# Patient Record
Sex: FEMALE | Race: BLACK OR AFRICAN AMERICAN | NOT HISPANIC OR LATINO | Employment: FULL TIME | URBAN - METROPOLITAN AREA
[De-identification: names, ages, dates, MRNs, and addresses within clinical notes are randomized per-mention and may not be internally consistent; named-entity substitution may affect disease eponyms.]

---

## 2017-03-30 ENCOUNTER — COMMUNICATION - HEALTHEAST (OUTPATIENT)
Dept: FAMILY MEDICINE | Facility: CLINIC | Age: 42
End: 2017-03-30

## 2017-04-11 ENCOUNTER — OFFICE VISIT - HEALTHEAST (OUTPATIENT)
Dept: FAMILY MEDICINE | Facility: CLINIC | Age: 42
End: 2017-04-11

## 2017-04-11 DIAGNOSIS — G43.009 ATYPICAL MIGRAINE: ICD-10-CM

## 2017-04-11 ASSESSMENT — MIFFLIN-ST. JEOR: SCORE: 1395.24

## 2017-06-12 ENCOUNTER — COMMUNICATION - HEALTHEAST (OUTPATIENT)
Dept: FAMILY MEDICINE | Facility: CLINIC | Age: 42
End: 2017-06-12

## 2017-06-13 ENCOUNTER — OFFICE VISIT - HEALTHEAST (OUTPATIENT)
Dept: FAMILY MEDICINE | Facility: CLINIC | Age: 42
End: 2017-06-13

## 2017-06-13 DIAGNOSIS — N76.0 VAGINITIS: ICD-10-CM

## 2017-06-13 ASSESSMENT — MIFFLIN-ST. JEOR: SCORE: 1394.1

## 2017-06-14 ENCOUNTER — COMMUNICATION - HEALTHEAST (OUTPATIENT)
Dept: FAMILY MEDICINE | Facility: CLINIC | Age: 42
End: 2017-06-14

## 2017-06-27 ENCOUNTER — RECORDS - HEALTHEAST (OUTPATIENT)
Dept: ADMINISTRATIVE | Facility: OTHER | Age: 42
End: 2017-06-27

## 2017-06-27 ENCOUNTER — COMMUNICATION - HEALTHEAST (OUTPATIENT)
Dept: FAMILY MEDICINE | Facility: CLINIC | Age: 42
End: 2017-06-27

## 2018-01-11 ENCOUNTER — COMMUNICATION - HEALTHEAST (OUTPATIENT)
Dept: FAMILY MEDICINE | Facility: CLINIC | Age: 43
End: 2018-01-11

## 2018-10-10 ENCOUNTER — HEALTH MAINTENANCE LETTER (OUTPATIENT)
Age: 43
End: 2018-10-10

## 2018-10-10 ENCOUNTER — OFFICE VISIT (OUTPATIENT)
Dept: FAMILY MEDICINE | Facility: CLINIC | Age: 43
End: 2018-10-10
Payer: COMMERCIAL

## 2018-10-10 VITALS
BODY MASS INDEX: 23.79 KG/M2 | OXYGEN SATURATION: 100 % | SYSTOLIC BLOOD PRESSURE: 125 MMHG | HEART RATE: 86 BPM | HEIGHT: 67 IN | WEIGHT: 151.6 LBS | TEMPERATURE: 98.5 F | DIASTOLIC BLOOD PRESSURE: 86 MMHG

## 2018-10-10 DIAGNOSIS — J06.9 VIRAL URI WITH COUGH: Primary | ICD-10-CM

## 2018-10-10 DIAGNOSIS — Z12.4 CERVICAL CANCER SCREENING: ICD-10-CM

## 2018-10-10 DIAGNOSIS — Z86.69 HISTORY OF ATYPICAL MIGRAINE: ICD-10-CM

## 2018-10-10 DIAGNOSIS — Z72.0 TOBACCO ABUSE DISORDER: ICD-10-CM

## 2018-10-10 RX ORDER — CYCLOBENZAPRINE HCL 10 MG
10 TABLET ORAL 3 TIMES DAILY PRN
Qty: 30 TABLET | Refills: 1 | Status: SHIPPED | OUTPATIENT
Start: 2018-10-10 | End: 2023-05-05

## 2018-10-10 NOTE — PATIENT INSTRUCTIONS
HOW TO DISPOSE OF MEDICINES    Holding onto unused or  medicines, both prescription and over the counter, poses many risks including the potential for drug abuse or poisoning. Disposing of your medicines properly can help keep you, your family, your friends, and our environment safe. Two options are available:     Dispose of unused medicines at home.  1. Take your medicines out of their container & mix with nghia litter, coffee grounds, or another undesirable substance.  2. Put the mixture in a disposable container with a lid or a sealed Ziploc bag.  3. Remove any personal information (like name, address, and prescription number) on the empty bottle label; or completely remove the label.  4. Throw away the now empty and unmarked bottle(s) and the sealed container of drug mixture into the regular trash.  5. NEVER THROW MEDICATIONS IN THE TOILET OR DOWN THE DRAIN.    Take the prescription bottles to a Medicine Collection Site.  These sites accept unused prescription and over-the-counter medicines. This is a free and confidential service, no ID is required.    Rules for disposal:  ? Bring medicines in their original containers.  ? If medication is no longer in original container, place medicine in clear plastic bag.  ? No needles, sharps, or syringes are accepted at these sites.  ? Remove your name and any other personal information (like address and prescription number) from the prescription label, but keep the drug name on the bottle.     Medicine drop off locations by [facility]: [facility]:  ? [place]  ? [facility] [place] [facility]: [address]  ? Monday - Friday 8am to 4pm  ? [place]  ? [place]: [address] [place] [address]  ? Monday - Friday 8:30am to 4:30pm  ? [place]  ? [facility] [place]: [address] [place] [address]  ? Monday - Friday 8am to 4pm  ? [place]: [address]  ? 7am - 11pm, 7 days a week

## 2018-10-10 NOTE — PROGRESS NOTES
"     HPI:   Nel Landers is a 43 year old female who presents as a new patient to clinic today to establish care.  Establish Care (Establish care) and Medication Reconciliation (completed, Pt is taking Flexrill )    Establish care  -past medical/surgical, family, and social history reviewed and updated  Nel recently changed insurance for work and was required to attend a clinic that was closer to her home in order for her to have proper coverage. Patient needed a refill of her Flexril 5 mg which was last filled in 2016. She uses it for atypical migraines that she gets about 1-2 times a month. It begins with tension and tightness in her shoulders and neck. Then it radiates up to the top of her head. If she does not treat it quickly, she reports it then advances to \"stroke-like\" symptoms where she is unable to move her whole body. It often resolves on its own after a while. To prevent her atypical migraines, she has an established treatment of taking her flexril, ibuprofen, and rests which has usually resolve the migraine. She has tried triptons and fioricet in the past but has associated nausea and sick-like feelings.     Nel presents with a 2 week cough and several episodes of fevers. She says that she went to an urgent care facility where she was prescribed Delsym, cough suppressant drops and Mucinin D. She says that her coughing has improved and reports no fevers in the past 48 hours.     Social: Nel has an 18.75 year tobacco use history.     The patient speaks English, so no  was used for this visit.        Review of Systems:   C: NEGATIVE for fatigue, unexpected change in weight  E: NEGATIVE for acute vision problems or changes  R: Positive for significant cough or shortness of breath  CV: NEGATIVE for chest pain, palpitations or new or worsening peripheral edema  GI: NEGATIVE for new onset or worsening nausea, vomiting or diarrhea  : NEGATIVE for frequency, dysuria, or hematuria  M: " "NEGATIVE for claudication, myalgias  N: NEGATIVE for weakness, dizziness, syncope, headaches  P: NEGATIVE for changes in mood or affect  GYN: LMP: No LMP recorded. and regular   Sexually Active: No   Sexual concerns: No    Contraception: Essure    Pregnancy History: [id number]   Menses: No LMP recorded.  are regular   STD History: Neg    Last Pap Smear Date: 2016 : PAP normal   Abnormal Pap History: None          PMHX:   There is no problem list on file for this patient.    No past medical history on file.  Past Surgical History:   Procedure Laterality Date     NO HISTORY OF SURGERY       Family History   Problem Relation Age of Onset     Hypertension Mother      Pacemaker Father      Hypertension Sister      Lung Cancer Maternal Grandmother      Diabetes Paternal Grandmother      Hyperlipidemia No family hx of      Social History   Substance Use Topics     Smoking status: Current Every Day Smoker     Smokeless tobacco: Never Used     Alcohol use Not on file     Social History     Social History Narrative     No narrative on file     No Known Allergies    There is no immunization history on file for this patient.  No results found for this or any previous visit (from the past 24 hour(s)).       Physical Exam:   /86  Pulse 86  Temp 98.5  F (36.9  C) (Oral)  Ht 5' 6.53\" (169 cm)  Wt 151 lb 9.6 oz (68.8 kg)  SpO2 100%  BMI 24.08 kg/m2  Gen: alert, NAD,   HENT: oral mucosa moist. No pharyngeal exudate or significant erythema  Card: RRR, no murmurs  Resp: end-expiratory wheezing with forced expiration. No crackles. Harsh cough. No asymmetry. No increased work of breathing  MS: extremities grossly normal. No LE edema  Skin: no rashes on exposed skin  Neuro: mentation intact, speech normal  Psych: mood normal, affect normal  Assessment and Plan     1. Viral URI with cough  Symptoms resolving with current treatment. Reassurance provided.     2. History of atypical migraine  Was hospitalized in 2014 for atypical " migraines. Has tried triptans and fioricet without success. Has an established treatment using flexeril and requested a refill. Last prescribed 5-10 mg 30 tabs with 3 refills in 6/2017, requesting 10 mg tablets so she doesn't always have to take 2.    - cyclobenzaprine (FLEXERIL) 10 MG tablet; Take 1 tablet (10 mg) by mouth 3 times daily as needed for other (migraine)  Dispense: 30 tablet; Refill: 1    3. Tobacco abuse disorder  Patient was in the planning stage. She has tried the patch with little success. She wants to start on Chantix. She plans to set a quit date in the next couple of weeks with her male partner.   -F/u in 4 weeks for tobacco cessation mayo    4. Cervical cancer screening  Due for pap in Spring 2019    Follow up: 4 wks for tobacco cessation counseling. Otherwise CPE in 3-5 months with pap    Jose Nicholson, MS3    I was present with the medical student who participated in the service and in the documentation of this note. I have verified the history and personally performed the physical exam and medical decision making, and have verified the content of the note, which accurately reflects my assessment of the patient and the plan of care.   MD Margret Escamilla MD, MPH  [facility] Family Medicine Resident    Precepted patient with Lorin Grove MD    Options for treatment and follow-up care were reviewed with the patient and/or guardian. Nel Landers and/or guardian engaged in the decision making process and verbalized understanding of the options discussed and agreed with the final plan.

## 2018-10-10 NOTE — MR AVS SNAPSHOT
After Visit Summary   10/10/2018    Nel Landers    MRN: [id number]           Patient Information     Date Of Birth          1975        Visit Information        Provider Department      10/10/2018 10:20 AM Margret Plasencia MD [facility]        Today's Diagnoses     Viral URI with cough    -  1    History of atypical migraine        Tobacco abuse disorder        Cervical cancer screening          Care Instructions    HOW TO DISPOSE OF MEDICINES    Holding onto unused or  medicines, both prescription and over the counter, poses many risks including the potential for drug abuse or poisoning. Disposing of your medicines properly can help keep you, your family, your friends, and our environment safe. Two options are available:     Dispose of unused medicines at home.  1. Take your medicines out of their container & mix with nghia litter, coffee grounds, or another undesirable substance.  2. Put the mixture in a disposable container with a lid or a sealed Ziploc bag.  3. Remove any personal information (like name, address, and prescription number) on the empty bottle label; or completely remove the label.  4. Throw away the now empty and unmarked bottle(s) and the sealed container of drug mixture into the regular trash.  5. NEVER THROW MEDICATIONS IN THE TOILET OR DOWN THE DRAIN.    Take the prescription bottles to a Medicine Collection Site.  These sites accept unused prescription and over-the-counter medicines. This is a free and confidential service, no ID is required.    Rules for disposal:  ? Bring medicines in their original containers.  ? If medication is no longer in original container, place medicine in clear plastic bag.  ? No needles, sharps, or syringes are accepted at these sites.  ? Remove your name and any other personal information (like address and prescription number) from the prescription label, but keep the drug name on the bottle.     Medicine drop off  "locations by county: [facility]:  ? [place]  ? [place]: [address]  ? Monday - Friday 8am to 4pm  ? [place]  ? [place]: [address] [place] [address]  ? Monday - Friday 8:30am to 4:30pm  ? [place]  ? [place]: [address] [place] [address]  ? Monday - Friday 8am to 4pm  ? [place]: [address]  ? 7am - 11pm, 7 days a week              Follow-ups after your visit        Follow-up notes from your care team     Return in about 4 weeks (around 2018) for tobacco.      Who to contact     Please call your clinic at [phone] to:    Ask questions about your health    Make or cancel appointments    Discuss your medicines    Learn about your test results    Speak to your doctor            Additional Information About Your Visit        MyChart Information     [email or web address] is an electronic gateway that provides easy, online access to your medical records. With [email or web address], you can request a clinic appointment, read your test results, renew a prescription or communicate with your care team.     To sign up for [email or web address] visit the website at [email or web address]   You will be asked to enter the access code listed below, as well as some personal information. Please follow the directions to create your username and password.     Your access code is: [email or web address]  Expires: 2019 10:05 AM     Your access code will  in 90 days. If you need help or a new code, please contact your [facility] or call [phone] for assistance.        Care EveryWhere ID     This is your Care EveryWhere ID. This could be used by other organizations to access your [facility] medical records  [id number]        Your Vitals Were     Pulse Temperature Height Pulse Oximetry BMI (Body Mass Index)       86 98.5  F (36.9  C) (Oral) 5' 6.53\" (169 cm) 100% 24.08 kg/m2        Blood Pressure from Last 3 Encounters:   10/10/18 125/86    Weight from Last 3 Encounters: "   10/10/18 151 lb 9.6 oz (68.8 kg)              Today, you had the following     No orders found for display         Today's Medication Changes          These changes are accurate as of 10/10/18 11:36 AM.  If you have any questions, ask your nurse or doctor.               Start taking these medicines.        Dose/Directions    cyclobenzaprine 10 MG tablet   Commonly known as:  FLEXERIL   Used for:  History of atypical migraine   Started by:  Margret Plasencia MD        Dose:  10 mg   Take 1 tablet (10 mg) by mouth 3 times daily as needed for other (migraine)   Quantity:  30 tablet   Refills:  1            Where to get your medicines      These medications were sent to [id number] - [place], [place] - [address]  [address] [facility], [place] [address]     Phone:  [phone]     cyclobenzaprine 10 MG tablet                Primary Care Provider Office Phone # Fax #    Margret Plasencia [phone]       [facility] [address] [place] [address]        Equal Access to Services     [facility]: Hadii aad ku hadasho Soomaali, waaxda luqadaha, qaybta kaalmada adeegyada, waxay idiin hayrosan tesfaye shaffer . So [facility] [phone].    ATENCIÓN: Si habla español, tiene a montgomery disposición servicios gratuitos de asistencia lingüística. Llame al [phone].    We comply with applicable federal civil rights laws and [place] laws. We do not discriminate on the basis of race, color, national origin, age, disability, sex, sexual orientation, or gender identity.            Thank you!     Thank you for choosing [facility]  for your care. Our goal is always to provide you with excellent care. Hearing back from our patients is one way we can continue to improve our services. Please take a few minutes to complete the written survey that you may receive in the mail after your visit with us. Thank you!             Your Updated Medication List - Protect others around  you: Learn how to safely use, store and throw away your medicines at www.disposemymeds.org.          This list is accurate as of 10/10/18 11:36 AM.  Always use your most recent med list.                   Brand Name Dispense Instructions for use Diagnosis    cyclobenzaprine 10 MG tablet    FLEXERIL    30 tablet    Take 1 tablet (10 mg) by mouth 3 times daily as needed for other (migraine)    History of atypical migraine

## 2018-10-19 NOTE — PROGRESS NOTES
Preceptor Attestation:   Patient seen, evaluated and discussed with the resident, Dr. Margret Plasencia. I have verified the content of the note, which accurately reflects my assessment of the patient and the plan of care.  Supervising Physician:Lorin Grove MD  [facility]

## 2021-05-25 ENCOUNTER — RECORDS - HEALTHEAST (OUTPATIENT)
Dept: ADMINISTRATIVE | Facility: CLINIC | Age: 46
End: 2021-05-25

## 2021-05-26 ENCOUNTER — RECORDS - HEALTHEAST (OUTPATIENT)
Dept: ADMINISTRATIVE | Facility: CLINIC | Age: 46
End: 2021-05-26

## 2021-05-29 ENCOUNTER — RECORDS - HEALTHEAST (OUTPATIENT)
Dept: ADMINISTRATIVE | Facility: CLINIC | Age: 46
End: 2021-05-29

## 2021-05-30 ENCOUNTER — RECORDS - HEALTHEAST (OUTPATIENT)
Dept: ADMINISTRATIVE | Facility: CLINIC | Age: 46
End: 2021-05-30

## 2021-05-30 VITALS — HEIGHT: 67 IN | WEIGHT: 156 LBS | BODY MASS INDEX: 24.48 KG/M2

## 2021-05-31 VITALS — HEIGHT: 68 IN | BODY MASS INDEX: 23.34 KG/M2 | WEIGHT: 154 LBS

## 2021-06-11 NOTE — PROGRESS NOTES
Assessment/Plan:     1. Vaginitis  Wet prep is positive for bacterial vaginosis will treat as below supportive care discussed.  Gonorrhea chlamydia pending.  Urinalysis negative.  - Wet Prep, Vaginal  - Urinalysis-UC if Indicated  - Chlamydia trachomatis & Neisseria gonorrhoeae, Amplified Detection  - metroNIDAZOLE (FLAGYL) 500 MG tablet; Take 1 tablet (500 mg total) by mouth 2 (two) times a day for 7 days.  Dispense: 14 tablet; Refill: 1    2. Chronic migraine  Refilled Flexeril.  Will try Relpax as patient has not found Fioricet or Imitrex to be helpful.  Did tolerate Percocet for migraines.  Again if symptoms worsen or do not improve become more frequent would recommend neurological evaluation.  - cyclobenzaprine (FLEXERIL) 5 MG tablet; Take 1-2 tablets (5-10 mg total) by mouth 3 (three) times a day as needed for muscle spasms.  Dispense: 30 tablet; Refill: 2  - eletriptan (RELPAX) 20 MG tablet; Take 1-2 tablets (20-40 mg total) by mouth as needed for migraine. may repeat in 2 hours if necessary  Dispense: 10 tablet; Refill: 2      Subjective:      Nel Campo is a 41 y.o. female comes in today primarily for vaginal discharge.  Patient originally states that she wants to follow-up on positive trichomoniasis result.  She states that she got this result from this office however I do not see past records of that.  I see that she has had a wet prep that was positive for yeast infection in the past.  She states she has been with her boyfriend for 4 years.  She states that he has not recently been treated for anything.  She states they do not use condoms but she believes her relationship to be monogamous and that they both had STD screenings prior to becoming sexually active.  She describes the symptoms as a foul fishy odor as well as significant amount of watery discharge that she will notice come out of her when she stands up states it feels like she wets her pants sometimes.  She has not had any significant  itching burning no changes with urination but she would like to get her urine checked today.  Also has history of chronic migraines.  At last visit we gave her Fioricet she did not find it to be helpful.  She has not found Imitrex to be helpful in the past.  She states Percocet works.  Also Flexeril works that she would like refill.  The headaches are not new and different in intensity.  She is getting about 1 a week.  No other new concerns today.    Current Outpatient Prescriptions   Medication Sig Dispense Refill     glucosamine-chondroitin 500-400 mg cap Take 1 capsule by mouth 3 (three) times a day.       multivitamin therapeutic (THERAGRAN) tablet Take 1 tablet by mouth daily.       OMEGA-3/DHA/EPA/FISH OIL (FISH OIL-OMEGA-3 FATTY ACIDS) 300-1,000 mg capsule Take 2 g by mouth daily.       cyclobenzaprine (FLEXERIL) 5 MG tablet Take 1-2 tablets (5-10 mg total) by mouth 3 (three) times a day as needed for muscle spasms. 30 tablet 2     eletriptan (RELPAX) 20 MG tablet Take 1-2 tablets (20-40 mg total) by mouth as needed for migraine. may repeat in 2 hours if necessary 10 tablet 2     metroNIDAZOLE (FLAGYL) 500 MG tablet Take 1 tablet (500 mg total) by mouth 2 (two) times a day for 7 days. 14 tablet 1     SUMAtriptan (IMITREX) 50 MG tablet   3     No current facility-administered medications for this visit.        Past Medical History, Family History, and Social History reviewed.  Past Medical History:   Diagnosis Date     Migraine      No past surgical history on file.  Review of patient's allergies indicates no known allergies.  Family History   Problem Relation Age of Onset     Multiple sclerosis Mother      Diabetes Father      Social History     Social History     Marital status:      Spouse name: N/A     Number of children: N/A     Years of education: N/A     Occupational History     Not on file.     Social History Main Topics     Smoking status: Current Every Day Smoker     Packs/day: 0.50      "Years: 26.00     Types: Cigarettes     Start date: 11/7/1990     Smokeless tobacco: Never Used     Alcohol use 0.6 oz/week     1 Standard drinks or equivalent per week      Comment: one drink a month     Drug use: No     Sexual activity: Not on file     Other Topics Concern     Not on file     Social History Narrative         Review of systems is as stated in HPI, and the remainder of the 10 system review is otherwise negative.    Objective:     Vitals:    06/13/17 1259   BP: 110/84   Pulse: 99   SpO2: 98%   Weight: 154 lb (69.9 kg)   Height: 5' 7.5\" (1.715 m)    Body mass index is 23.76 kg/(m^2).    General Appearance:    Alert, cooperative, no distress, appears stated age   Head:    Normocephalic, without obvious abnormality, atraumatic   Eyes:    PERRL   Ears:    Normal TM's and external ear canals   Nose:   Mucosa normal, no drainage      Throat:   Oropharynx is clear   Neck:   Supple, symmetrical, no adenopathy, no thyromegally       Lungs:     Clear to auscultation bilaterally, respirations unlabored   Chest Wall:    No tenderness or deformity    Heart:    Regular rate and rhythm, S1 and S2 normal, no murmur, rub    or gallop       Abdomen:     Soft, non-tender, bowel sounds active all four quadrants,     no masses, no organomegaly           Extremities:   Extremities normal, atraumatic, no cyanosis or edema   Pelvic:  there is no inguinal lymphadenopathy rate uterus adnexa are small mobile nontender.  Normal vaginal vault with thin watery slightly white in color discharge.  No significant cervical motion tenderness.     Skin:   No rashes or lesions         This note has been dictated using voice recognition software. Any grammatical or context distortions are unintentional and inherent to the the software.   "

## 2022-11-10 ENCOUNTER — OFFICE VISIT (OUTPATIENT)
Dept: FAMILY MEDICINE | Facility: CLINIC | Age: 47
End: 2022-11-10

## 2022-11-10 VITALS
HEART RATE: 74 BPM | OXYGEN SATURATION: 99 % | RESPIRATION RATE: 16 BRPM | DIASTOLIC BLOOD PRESSURE: 78 MMHG | BODY MASS INDEX: 24.14 KG/M2 | SYSTOLIC BLOOD PRESSURE: 110 MMHG | WEIGHT: 152 LBS | TEMPERATURE: 98.5 F

## 2022-11-10 DIAGNOSIS — Z78.9 FAILURE OF OUTPATIENT TREATMENT: ICD-10-CM

## 2022-11-10 DIAGNOSIS — N12 PYELONEPHRITIS: Primary | ICD-10-CM

## 2022-11-10 DIAGNOSIS — R82.90 ABNORMAL URINE ODOR: ICD-10-CM

## 2022-11-10 LAB
ALBUMIN UR-MCNC: NEGATIVE MG/DL
APPEARANCE UR: ABNORMAL
BACTERIA #/AREA URNS HPF: ABNORMAL /HPF
BILIRUB UR QL STRIP: NEGATIVE
COLOR UR AUTO: YELLOW
GLUCOSE UR STRIP-MCNC: NEGATIVE MG/DL
HGB UR QL STRIP: NEGATIVE
KETONES UR STRIP-MCNC: NEGATIVE MG/DL
LEUKOCYTE ESTERASE UR QL STRIP: NEGATIVE
NITRATE UR QL: POSITIVE
PH UR STRIP: 6.5 [PH] (ref 5–8)
RBC #/AREA URNS AUTO: ABNORMAL /HPF
SP GR UR STRIP: 1.02 (ref 1–1.03)
SQUAMOUS #/AREA URNS AUTO: ABNORMAL /LPF
UROBILINOGEN UR STRIP-ACNC: 1 E.U./DL
WBC #/AREA URNS AUTO: ABNORMAL /HPF

## 2022-11-10 PROCEDURE — 99203 OFFICE O/P NEW LOW 30 MIN: CPT | Performed by: NURSE PRACTITIONER

## 2022-11-10 PROCEDURE — 87186 SC STD MICRODIL/AGAR DIL: CPT | Performed by: NURSE PRACTITIONER

## 2022-11-10 PROCEDURE — 81001 URINALYSIS AUTO W/SCOPE: CPT | Performed by: NURSE PRACTITIONER

## 2022-11-10 PROCEDURE — 87086 URINE CULTURE/COLONY COUNT: CPT | Performed by: NURSE PRACTITIONER

## 2022-11-10 RX ORDER — CIPROFLOXACIN 500 MG/1
500 TABLET, FILM COATED ORAL 2 TIMES DAILY
Qty: 14 TABLET | Refills: 0 | Status: SHIPPED | OUTPATIENT
Start: 2022-11-10 | End: 2022-11-17

## 2022-11-10 RX ORDER — OMEGA-3 FATTY ACIDS/FISH OIL 300-1000MG
200 CAPSULE ORAL EVERY 4 HOURS PRN
COMMUNITY
End: 2023-10-31

## 2022-11-10 ASSESSMENT — ENCOUNTER SYMPTOMS: FEVER: 0

## 2022-11-10 NOTE — LETTER
[address] [address]  Phone: [phone]  Fax: [phone]    November 10, 2022        Nel Landers  [address]  [place] [address]          To whom it may concern:    RE: Nel Landers    Patient was seen and treated today at our clinic.  She may need off full or partial day tomorrow.  If she decides she cannot complete work tomorrow, please excuse her for medical reason.    Please contact me for questions or concerns.      Sincerely,        Vicki Whitaker, CNP

## 2022-11-11 NOTE — PROGRESS NOTES
Assessment & Plan     Abnormal urine odor    - UA macro with reflex to Microscopic and Culture - Clinc Collect  - Urine Microscopic Exam  - Urine Culture    Pyelonephritis    - ciprofloxacin (CIPRO) 500 MG tablet  Dispense: 14 tablet; Refill: 0    Failure of outpatient treatment       Patient with persistent urinary symptoms for a month without improvement on Macrobid done via E-visit.    Did have some CVA tenderness.  States her back pain is intermittent.  However it did start after the urinary symptoms, so do not suspect kidney stone is the primary issue.    Start Cipro.  Tylenol or ibuprofen as needed for discomfort.  Come back in 2 to 3 days if not much better, sooner if worsening such as fevers.          Return in about 3 days (around 11/13/2022).    Vicki Whitaker [facility] FELISHA Neumann is a 47 year old female who presents to clinic today for the following health issues:  Chief Complaint   Patient presents with     Back Pain     Back pain, pressure, urinate dark, cloudy, odor x 1 month      HPI    Bilateral back pain -areas of back pain seem to switch back and forth from right to left.    Urinary frequency, urgency, intermittent dysuria.,  Lower abdominal pressure.  No fevers.    Took macrobid from a virtual visit.  Temporarily improved, but sx returned.       Is a  and is bouncing around on the bus all day long, but normally does not have back pain.  Back pain started after urinary symptoms started.           Review of Systems   Constitutional: Negative for fever.           Objective    /78 (BP Location: Right arm, Patient Position: Sitting, Cuff Size: Adult Regular)   Pulse 74   Temp 98.5  F (36.9  C) (Oral)   Resp 16   Wt 68.9 kg (152 lb)   SpO2 99%   BMI 24.14 kg/m    Physical Exam  Constitutional:       General: She is not in acute distress.     Appearance: She is well-developed.   Eyes:      General:         Right eye: No  discharge.         Left eye: No discharge.      Conjunctiva/sclera: Conjunctivae normal.   Pulmonary:      Effort: Pulmonary effort is normal.   Abdominal:      Tenderness: There is right CVA tenderness. There is no left CVA tenderness.   Musculoskeletal:         General: Normal range of motion.   Skin:     General: Skin is warm and dry.      Capillary Refill: Capillary refill takes less than 2 seconds.   Neurological:      Mental Status: She is alert and oriented to person, place, and time.   Psychiatric:         Mood and Affect: Mood normal.         Behavior: Behavior normal.         Thought Content: Thought content normal.         Judgment: Judgment normal.            Results for orders placed or performed in visit on 11/10/22 (from the past 24 hour(s))   UA macro with reflex to Microscopic and Culture - Clinc Collect    Specimen: Urine, Clean Catch   Result Value Ref Range    Color Urine Yellow Colorless, Straw, Light Yellow, Yellow    Appearance Urine Slightly Cloudy (A) Clear    Glucose Urine Negative Negative, 1000 , >=2000 mg/dL    Bilirubin Urine Negative Negative    Ketones Urine Negative Negative, 160  mg/dL    Specific Gravity Urine 1.025 1.005 - 1.030    Blood Urine Negative Negative    pH Urine 6.5 5.0 - 8.0    Protein Albumin Urine Negative Negative, 300 , >=2000 mg/dL    Urobilinogen Urine 1.0 0.2, 1.0 E.U./dL    Nitrite Urine Positive (A) Negative    Leukocyte Esterase Urine Negative Negative   Urine Microscopic Exam   Result Value Ref Range    Bacteria Urine Many (A) None Seen /HPF    RBC Urine 2-5 (A) 0-2 /HPF /HPF    WBC Urine 5-10 (A) 0-5 /HPF /HPF    Squamous Epithelials Urine Moderate (A) None Seen /LPF

## 2022-11-11 NOTE — PATIENT INSTRUCTIONS
Your urine does show an infection.    We will call you if the urine culture shows that the bacteria causing the infection is resistant to the antibiotic we prescribed.    You should feel more or less back to normal after 2 full days of treatment.    You may take Tylenol or ibuprofen as needed for discomfort.    If you are getting worse such as fever, worsening back pain or you are not better after 3 days, please come back over the weekend.    The antibiotic we are giving you should also treat kidney infections.

## 2022-11-13 ENCOUNTER — TELEPHONE (OUTPATIENT)
Dept: URGENT CARE | Facility: URGENT CARE | Age: 47
End: 2022-11-13

## 2022-11-13 DIAGNOSIS — N12 PYELONEPHRITIS: Primary | ICD-10-CM

## 2022-11-13 LAB — BACTERIA UR CULT: ABNORMAL

## 2022-11-13 RX ORDER — CEFDINIR 300 MG/1
300 CAPSULE ORAL 2 TIMES DAILY
Qty: 14 CAPSULE | Refills: 0 | Status: SHIPPED | OUTPATIENT
Start: 2022-11-13 | End: 2022-11-20

## 2022-11-13 NOTE — TELEPHONE ENCOUNTER
Called to discuss intermediate sensitivity to Cipro.  Patient states her back pain is almost gone, but not completely.  She has transient twinges of back pain.  Patient would not like to start a new medication after discussion immediately.  Patient will see if she is improved by tomorrow and  cefdinir if not.  Cefdinir twice daily for 7 days sent to her pharmacy.

## 2023-05-05 ENCOUNTER — OFFICE VISIT (OUTPATIENT)
Dept: FAMILY MEDICINE | Facility: CLINIC | Age: 48
End: 2023-05-05
Payer: COMMERCIAL

## 2023-05-05 VITALS
DIASTOLIC BLOOD PRESSURE: 70 MMHG | BODY MASS INDEX: 25.43 KG/M2 | SYSTOLIC BLOOD PRESSURE: 120 MMHG | TEMPERATURE: 97.7 F | HEART RATE: 85 BPM | HEIGHT: 67 IN | WEIGHT: 162 LBS | RESPIRATION RATE: 19 BRPM | OXYGEN SATURATION: 99 %

## 2023-05-05 DIAGNOSIS — Z00.00 ROUTINE PHYSICAL EXAMINATION: Primary | ICD-10-CM

## 2023-05-05 DIAGNOSIS — Z13.220 SCREENING FOR HYPERLIPIDEMIA: ICD-10-CM

## 2023-05-05 DIAGNOSIS — Z23 ENCOUNTER FOR IMMUNIZATION: ICD-10-CM

## 2023-05-05 DIAGNOSIS — Z12.4 CERVICAL CANCER SCREENING: ICD-10-CM

## 2023-05-05 DIAGNOSIS — Z13.1 SCREENING FOR DIABETES MELLITUS: ICD-10-CM

## 2023-05-05 DIAGNOSIS — Z12.11 SCREEN FOR COLON CANCER: ICD-10-CM

## 2023-05-05 DIAGNOSIS — Z01.84 IMMUNITY STATUS TESTING: ICD-10-CM

## 2023-05-05 DIAGNOSIS — Z12.31 ENCOUNTER FOR SCREENING MAMMOGRAM FOR BREAST CANCER: ICD-10-CM

## 2023-05-05 DIAGNOSIS — Z72.0 TOBACCO ABUSE DISORDER: ICD-10-CM

## 2023-05-05 DIAGNOSIS — Z12.31 VISIT FOR SCREENING MAMMOGRAM: ICD-10-CM

## 2023-05-05 DIAGNOSIS — Z86.69 HISTORY OF ATYPICAL MIGRAINE: ICD-10-CM

## 2023-05-05 LAB
CHOLEST SERPL-MCNC: 214 MG/DL
HBA1C MFR BLD: 5.2 % (ref 0–5.6)
HDLC SERPL-MCNC: 54 MG/DL
LDLC SERPL CALC-MCNC: 138 MG/DL
NONHDLC SERPL-MCNC: 160 MG/DL
TRIGL SERPL-MCNC: 110 MG/DL

## 2023-05-05 PROCEDURE — 87624 HPV HI-RISK TYP POOLED RSLT: CPT | Performed by: FAMILY MEDICINE

## 2023-05-05 PROCEDURE — 80061 LIPID PANEL: CPT | Performed by: FAMILY MEDICINE

## 2023-05-05 PROCEDURE — 83036 HEMOGLOBIN GLYCOSYLATED A1C: CPT | Performed by: FAMILY MEDICINE

## 2023-05-05 PROCEDURE — 90677 PCV20 VACCINE IM: CPT | Performed by: FAMILY MEDICINE

## 2023-05-05 PROCEDURE — 36415 COLL VENOUS BLD VENIPUNCTURE: CPT | Performed by: FAMILY MEDICINE

## 2023-05-05 PROCEDURE — 86706 HEP B SURFACE ANTIBODY: CPT | Performed by: FAMILY MEDICINE

## 2023-05-05 PROCEDURE — 99396 PREV VISIT EST AGE 40-64: CPT | Mod: 25 | Performed by: FAMILY MEDICINE

## 2023-05-05 PROCEDURE — 90472 IMMUNIZATION ADMIN EACH ADD: CPT | Performed by: FAMILY MEDICINE

## 2023-05-05 PROCEDURE — 90471 IMMUNIZATION ADMIN: CPT | Performed by: FAMILY MEDICINE

## 2023-05-05 PROCEDURE — G0145 SCR C/V CYTO,THINLAYER,RESCR: HCPCS | Performed by: FAMILY MEDICINE

## 2023-05-05 PROCEDURE — 90715 TDAP VACCINE 7 YRS/> IM: CPT | Performed by: FAMILY MEDICINE

## 2023-05-05 ASSESSMENT — ENCOUNTER SYMPTOMS
DIARRHEA: 0
HEARTBURN: 0
EYE PAIN: 0
HEADACHES: 0
ARTHRALGIAS: 1
DYSURIA: 0
ABDOMINAL PAIN: 0
FEVER: 0
HEMATURIA: 0
NERVOUS/ANXIOUS: 0
SORE THROAT: 0
NAUSEA: 0
MYALGIAS: 1
PALPITATIONS: 0
HEMATOCHEZIA: 0
SHORTNESS OF BREATH: 0
CHILLS: 0
PARESTHESIAS: 0
FREQUENCY: 1
COUGH: 0
JOINT SWELLING: 0
CONSTIPATION: 0
BREAST MASS: 0
DIZZINESS: 0
WEAKNESS: 0

## 2023-05-05 ASSESSMENT — PAIN SCALES - GENERAL: PAINLEVEL: NO PAIN (0)

## 2023-05-05 NOTE — PROGRESS NOTES
"  Assessment/Plan:     Routine physical examination  Routine healthcare maintenance.  Preventative cares reviewed.  Annual physical exams to continue.    Visit for screening mammogram  Screening mammogram to be completed at earliest convenience.  - MA SCREENING DIGITAL BILAT - Future  (s+30)    History of atypical migraine  History of described atypical migraine headaches with \"complicated migraines appearing as mini strokes\".  No recent issues.    Cervical cancer screening  Cervical cancer screening with SurePath Pap smear with HPV cotesting regardless.  - Pap Screen with HPV - recommended age 30 - 65 years    Tobacco abuse disorder  Tobacco use disorder with smoke cessation efforts to continue with current vaping of \"low percentage\" nicotine product.    Screen for colon cancer  Colonoscopy for colon cancer screening with referral placed.  - Colonoscopy Screening  Referral    Screening for diabetes mellitus  A1c for diabetes screening  - Hemoglobin A1c    Screening for hyperlipidemia  Nonfasting lipid cascade completed today for hyperlipidemia screening.  - Lipid panel reflex to direct LDL Non-fasting    Encounter for immunization  Pneumococcal 20 and Adacel booster provided today.  - Pneumococcal 20 Valent Conjugate (Prevnar 20)  - TDAP VACCINE (Adacel, Boostrix)    Immunity status testing  Hepatitis B immunity status testing completed.  - Hepatitis B Surface Antibody         1. Annual Physical Exam.  Encouraged healthy lifestyle habits including regular exercise, healthy eating habits, and adequate calcium and vitamin D intake.         Subjective:     Nel Landers is a 47 year old female who presents for an annual exam.  Reestablishing care through this office.  Past medical social and family history reviewed and updated as noted below.  Patient unfortunately vaping low-dose percentage nicotine product.  Denies recent illness.  Father with diabetes mother with hypertension as well as MS.  Patient has " "had Essure procedure in the past for sterilization.  Tetanus status overdue greater than 10 years.  Has not had pneumococcal 20 vaccine despite nicotine dependence.  Describes history of atypical migraine.  No recent issues with this however.  Describe prior concerns for \"complicated migraines appearing as mini strokes\".  Comprehensive review of systems as above otherwise all negative       - Johan   1 daughter - Katalina (29)   1 son - Mazin (26)   1 son - Larry (21)   1 daughter - Carole (16)   Tobacco:  vape (nicotine \"low percentage\")   EtOH:  none ( since age 23 and I like my job)   Mom - HTN, M.S. 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  Lipid Profile: Yes  Glucose Screen: Yes  Last Mammogram: Yes  Colonoscopy: No and will schedule  Last Dexa: N/A    Immunization History   Administered Date(s) Administered     DT (PEDS <7y) 2000     Pneumococcal 20 valent Conjugate (Prevnar 20) 2023     TDAP (Adacel,Boostrix) 2011, 2023     Immunization status: needs Tdap and pneumococcal 20.      Gynecologic History  Patient's last menstrual period was 2023 (exact date).  Sexually active: Yes  Contraception: Essure  Last Pap: 3/21/14. Results were: normal      OB History    Para Term  AB Living   2 2 0 0 0 2   SAB IAB Ectopic Multiple Live Births   0 0 0 0 0      # Outcome Date GA Lbr Phillip/2nd Weight Sex Delivery Anes " PTL Lv   2 Para            1 Para                Current Outpatient Medications   Medication Sig Dispense Refill     ibuprofen (ADVIL/MOTRIN) 200 MG capsule Take 200 mg by mouth every 4 hours as needed for fever       cyclobenzaprine (FLEXERIL) 10 MG tablet Take 1 tablet (10 mg) by mouth 3 times daily as needed for other (migraine) (Patient not taking: Reported on 11/10/2022) 30 tablet 1     Past Medical History:   Diagnosis Date     Atypical migraine 2014    Managed with flexiril, ibuprofen, and rest     Beta thalassemia (H)     Anemic,      Past Surgical History:   Procedure Laterality Date     NO HISTORY OF SURGERY       SKIN GRAFT, EACH ADDN 100SQCM  During childhood    Tip of left 5th finger was removed and needed skin graft to replace missing skin     Vicodin [hydrocodone-acetaminophen]  Family History   Problem Relation Age of Onset     Hypertension Mother      Multiple Sclerosis Mother      Pacemaker Father      Hypertension Sister      Lung Cancer Maternal Grandmother      Diabetes Paternal Grandmother      Hyperlipidemia No family hx of      Diabetes Father      Social History     Socioeconomic History     Marital status:      Spouse name: Not on file     Number of children: Not on file     Years of education: Not on file     Highest education level: Not on file   Occupational History     Not on file   Tobacco Use     Smoking status: Former     Packs/day: 0.75     Years: 25.00     Pack years: 18.75     Types: Cigarettes     Passive exposure: Never     Smokeless tobacco: Never     Tobacco comments:     Patient wants to start quitting within a month with a friend    Vaping Use     Vaping status: Every Day   Substance and Sexual Activity     Alcohol use: Not on file     Drug use: Not on file     Sexual activity: Not on file   Other Topics Concern     Not on file   Social History Narrative    Patient works for a [employer] company and 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  "    Social Determinants of Health     Financial Resource Strain: Not on file   Food Insecurity: Not on file   Transportation Needs: Not on file   Physical Activity: Not on file   Stress: Not on file   Social Connections: Not on file   Intimate Partner Violence: Not on file   Housing Stability: Not on file       Review of Systems  General:  Denies problem  Eyes: Denies problem  Ears/Nose/Throat: Denies problem  Cardiovascular: Denies problem  Respiratory:  Denies problem  Gastrointestinal:  Denies problem, Genitourinary: Denies problem  Musculoskeletal:  Denies problem  Skin: Denies problem  Neurologic: Denies problem  Psychiatric: Denies problem  Endocrine: Denies problem  Heme/Lymphatic: Denies problem   Allergic/Immunologic: Denies problem       [unfilled]    Objective:        Vitals:    05/05/23 1435   BP: 120/70   Pulse: 85   Resp: 19   Temp: 97.7  F (36.5  C)   SpO2: 99%   Weight: 73.5 kg (162 lb)   Height: 1.695 m (5' 6.75\")   PainSc: No Pain (0)     Body mass index is 25.56 kg/m .    Physical Exam:    General Appearance: Alert, pleasant, appears stated age  Head: Normocephalic, without obvious abnormality  Eyes: PERRL, conjunctiva/corneas clear, EOM's intact  Ears: Normal TM's and external ear canals, both ears  Nose: Nares normal, septum midline,mucosa normal, no drainage  Throat: Lips, mucosa, and tongue normal; teeth and gums normal; oropharynx is clear  Neck: Supple,without lymphadenopathy or thyromegally  Lungs: Clear to auscultation bilaterally, respirations unlabored  Breasts: Nopalpable masses, tenderness, asymmetry, or nipple discharge. No axillary or supraclavicular lymphadenopathy  Heart: Regular rate and rhythm, no murmur   Abdomen: Soft, non-tender, no masses, no organomegaly  Pelvic:  normal.  Multiparous cervix.  Extremities: Extremities with strong and symmetric pulses, no cyanosis or edema  Skin: Skin color, texture normal, no rashes or lesions  Neurologic: Normal            This note " has been dictated using voice recognition software and as a result may contain minor grammatical errors and unintended word substitutions.           Answers for HPI/ROS submitted by the patient on 5/5/2023  Frequency of exercise:: None  Getting at least 3 servings of Calcium per day:: NO  Diet:: Other  Taking medications regularly:: Yes  Medication side effects:: None  Bi-annual eye exam:: Yes  Dental care twice a year:: NO  Sleep apnea or symptoms of sleep apnea:: None  abdominal pain: No  Blood in stool: No  Blood in urine: No  chest pain: No  chills: No  congestion: No  constipation: No  cough: No  diarrhea: No  dizziness: No  ear pain: No  eye pain: No  nervous/anxious: No  fever: No  frequency: Yes  genital sores: No  headaches: No  hearing loss: No  heartburn: No  arthralgias: Yes  joint swelling: No  peripheral edema: No  mood changes: No  myalgias: Yes  nausea: No  dysuria: No  palpitations: No  Skin sensation changes: No  sore throat: No  urgency: No  rash: No  shortness of breath: No  visual disturbance: No  weakness: No  pelvic pain: No  vaginal bleeding: No  vaginal discharge: No  tenderness: No  breast mass: No  breast discharge: No  Additional concerns today:: Yes

## 2023-05-07 ENCOUNTER — HEALTH MAINTENANCE LETTER (OUTPATIENT)
Age: 48
End: 2023-05-07

## 2023-05-08 LAB
HBV SURFACE AB SERPL IA-ACNC: 0.48 M[IU]/ML
HBV SURFACE AB SERPL IA-ACNC: NONREACTIVE M[IU]/ML

## 2023-05-10 LAB
BKR LAB AP GYN ADEQUACY: NORMAL
BKR LAB AP GYN INTERPRETATION: NORMAL
BKR LAB AP HPV REFLEX: NORMAL
BKR LAB AP LMP: NORMAL
BKR LAB AP PREVIOUS ABNORMAL: NORMAL
PATH REPORT.COMMENTS IMP SPEC: NORMAL
PATH REPORT.COMMENTS IMP SPEC: NORMAL
PATH REPORT.RELEVANT HX SPEC: NORMAL

## 2023-05-11 LAB
HUMAN PAPILLOMA VIRUS 16 DNA: NEGATIVE
HUMAN PAPILLOMA VIRUS 18 DNA: NEGATIVE
HUMAN PAPILLOMA VIRUS FINAL DIAGNOSIS: NORMAL
HUMAN PAPILLOMA VIRUS OTHER HR: NEGATIVE

## 2023-05-12 PROBLEM — Z12.4 CERVICAL CANCER SCREENING: Status: ACTIVE | Noted: 2018-10-10

## 2023-05-12 PROBLEM — R87.810 CERVICAL HIGH RISK HPV (HUMAN PAPILLOMAVIRUS) TEST POSITIVE: Status: ACTIVE | Noted: 2018-10-10

## 2023-07-14 ENCOUNTER — TELEPHONE (OUTPATIENT)
Dept: FAMILY MEDICINE | Facility: CLINIC | Age: 48
End: 2023-07-14
Payer: COMMERCIAL

## 2023-07-14 NOTE — TELEPHONE ENCOUNTER
Patient called into the clinic to find out next steps to prepare for her future colonoscopy.    Writer gave the phone number for [facility] to the patient [phone] in order to find out what she needs to do next.    Patient denied other questions or concerns at this time.    Colette Franklin RN, BSN  [facility]

## 2023-07-20 ENCOUNTER — ANCILLARY PROCEDURE (OUTPATIENT)
Dept: MAMMOGRAPHY | Facility: HOSPITAL | Age: 48
End: 2023-07-20
Attending: FAMILY MEDICINE
Payer: COMMERCIAL

## 2023-07-20 DIAGNOSIS — Z12.31 VISIT FOR SCREENING MAMMOGRAM: ICD-10-CM

## 2023-07-20 PROCEDURE — 77067 SCR MAMMO BI INCL CAD: CPT

## 2023-07-21 ENCOUNTER — TRANSFERRED RECORDS (OUTPATIENT)
Dept: HEALTH INFORMATION MANAGEMENT | Facility: CLINIC | Age: 48
End: 2023-07-21
Payer: COMMERCIAL

## 2023-08-02 ENCOUNTER — ANCILLARY PROCEDURE (OUTPATIENT)
Dept: MAMMOGRAPHY | Facility: CLINIC | Age: 48
End: 2023-08-02
Attending: FAMILY MEDICINE
Payer: COMMERCIAL

## 2023-08-02 DIAGNOSIS — N63.20 MASS OF LEFT BREAST, UNSPECIFIED QUADRANT: ICD-10-CM

## 2023-08-02 PROCEDURE — 77061 BREAST TOMOSYNTHESIS UNI: CPT | Mod: LT

## 2023-10-31 ENCOUNTER — ANCILLARY PROCEDURE (OUTPATIENT)
Dept: GENERAL RADIOLOGY | Facility: CLINIC | Age: 48
End: 2023-10-31
Attending: FAMILY MEDICINE
Payer: COMMERCIAL

## 2023-10-31 ENCOUNTER — OFFICE VISIT (OUTPATIENT)
Dept: FAMILY MEDICINE | Facility: CLINIC | Age: 48
End: 2023-10-31
Payer: COMMERCIAL

## 2023-10-31 VITALS
TEMPERATURE: 98.4 F | HEART RATE: 82 BPM | WEIGHT: 172 LBS | DIASTOLIC BLOOD PRESSURE: 87 MMHG | SYSTOLIC BLOOD PRESSURE: 136 MMHG | RESPIRATION RATE: 20 BRPM | OXYGEN SATURATION: 98 % | BODY MASS INDEX: 27.14 KG/M2

## 2023-10-31 DIAGNOSIS — M25.572 PAIN IN JOINT INVOLVING ANKLE AND FOOT, LEFT: ICD-10-CM

## 2023-10-31 DIAGNOSIS — M25.572 PAIN IN JOINT INVOLVING ANKLE AND FOOT, LEFT: Primary | ICD-10-CM

## 2023-10-31 PROBLEM — D56.8 OTHER THALASSEMIA (H): Status: ACTIVE | Noted: 2023-10-31

## 2023-10-31 PROBLEM — D12.0 BENIGN NEOPLASM OF CECUM: Status: ACTIVE | Noted: 2023-07-25

## 2023-10-31 PROCEDURE — 99214 OFFICE O/P EST MOD 30 MIN: CPT | Performed by: FAMILY MEDICINE

## 2023-10-31 PROCEDURE — 73610 X-RAY EXAM OF ANKLE: CPT | Mod: TC | Performed by: RADIOLOGY

## 2023-10-31 ASSESSMENT — ENCOUNTER SYMPTOMS: CONSTITUTIONAL NEGATIVE: 1

## 2023-10-31 NOTE — COMMUNITY RESOURCES LIST (ENGLISH)
10/31/2023   [facility] [place]  N/A  For questions about this resource list or additional care needs, please contact your primary care clinic or care manager.  Phone: [phone]   Email: N/A   Address: [address] [place] [id number]   Hours: N/A        Financial Stability       Utility payment assistance  1  [employer] ([facility]) [facility] Assistance Distance: 1.28 miles      Phone/Virtual   [address] [place] [address]  Language: English, Hmong, Gibraltarian, Slovak  Hours: Mon - Fri 8:00 AM - 12:00 PM , Mon - Fri 1:00 PM - 4:30 PM  Fees: Free   Phone: (460) 316-4551 Email: esthela@caprw.org Website: http://www.caprw.org     2  [place] [employer] - [place]'s Telephone Assistance Plan (TAP) and Federal Lifeline and Affordable Connectivity Program (ACP) Distance: 2.43 miles      Phone/Virtual   [address] [place] [address]  Language: English  Fees: Free   Phone: (328) 517-9091 Email: consumer.piedad@[facility]. Website: https://[place].gov[place]puc/consumers/telephone/          Important Numbers & Websites       Emergency Services   911  City Services   311  Poison Control   (669) 743-5919  Suicide Prevention Lifeline   (511) 362-6695 (TALK)  Child Abuse Hotline   (954) 783-9352 (4-A-Child)  Sexual Assault Hotline   (448) 329-2670 (HOPE)  National Runaway Safeline   (234) 283-7516 (RUNAWAY)  All-Options Talkline   (624) 903-9674  Substance Abuse Referral   (653) 460-7793 (HELP)

## 2023-10-31 NOTE — PROGRESS NOTES
Assessment & Plan   Problem List Items Addressed This Visit    None  Visit Diagnoses       Pain in joint involving ankle and foot, left    -  Primary: Ankle sprain.  Slow recovery anticipated.  Wear lace up support brace (purchased over-the-counter).  Aleve 220 mg twice daily for the next 7 days.  Physical therapy if not improving.  Consider podiatry referral if not improving.    Relevant Orders    XR Ankle Left G/E 3 Views          Fabrizio Luciano MD  [facility]    Arvind Neumann is a 48 year old, presenting for the following health issues:  Musculoskeletal Problem (Left ankle injury x a couple of weeks)        10/31/2023     7:06 AM   Additional Questions   Roomed by xl       Left ankle injury:   - 2 weeks ago she was running and missed the side walk   - inititial improvement but now not getting better   - decreased mobility.   - some swelling.  Some black and blue.   - decreased Range of motion   - her job has a physical component.    - she was able to walk within a few minutes of the injury.   - ibuprofen helps.  Only taking as needed at this time.      History of Present Illness       Reason for visit:  Rolled ankle  Symptom onset:  1-2 weeks ago    She eats 0-1 servings of fruits and vegetables daily.She consumes 1 sweetened beverage(s) daily.She exercises with enough effort to increase her heart rate 10 to 19 minutes per day.  She exercises with enough effort to increase her heart rate 4 days per week.   She is taking medications regularly.     Review of Systems   Constitutional: Negative.    All other systems reviewed and are negative.         Objective    /87 (BP Location: Left arm, Patient Position: Sitting, Cuff Size: Adult Regular)   Pulse 82   Temp 98.4  F (36.9  C) (Oral)   Resp 20   Wt 78 kg (172 lb)   LMP  (LMP Unknown)   SpO2 98%   BMI 27.14 kg/m    Body mass index is 27.14 kg/m .  Physical Exam  Nursing note reviewed.   Constitutional:       General:  She is not in acute distress.     Appearance: Normal appearance. She is not ill-appearing.   HENT:      Head: Normocephalic and atraumatic.   Eyes:      Extraocular Movements: Extraocular movements intact.      Conjunctiva/sclera: Conjunctivae normal.   Pulmonary:      Effort: Pulmonary effort is normal.   Musculoskeletal:      Comments: Minimal swelling with visualization.  She has pain over the cuboid and cuneiform bones in the dorsum of the left foot.  She reports some radiation of pain downward with palpation along the fibula.  The lateral malleolus is tender to palpation over the talofibular ligament.  Pain with both inversion and extraversion.  Walks with mildly antalgic gait.   Neurological:      Mental Status: She is alert and oriented to person, place, and time.   Psychiatric:         Attention and Perception: Attention normal.         Mood and Affect: Mood normal.         Speech: Speech normal.         Thought Content: Thought content normal.        Left ankle x-ray: No acute fracture.  My interpretation

## 2023-11-22 ENCOUNTER — MYC MEDICAL ADVICE (OUTPATIENT)
Dept: FAMILY MEDICINE | Facility: CLINIC | Age: 48
End: 2023-11-22
Payer: COMMERCIAL

## 2023-11-22 ENCOUNTER — NURSE TRIAGE (OUTPATIENT)
Dept: FAMILY MEDICINE | Facility: CLINIC | Age: 48
End: 2023-11-22
Payer: COMMERCIAL

## 2023-11-22 NOTE — TELEPHONE ENCOUNTER
"Nurse Triage SBAR    Is this a 2nd Level Triage? YES, LICENSED PRACTITIONER REVIEW IS REQUIRED    Situation:   See Stephanieelvira message into the clinic on 11/22/23 from the patient:    \"I tested positive Saturday along with 2 other coworkers. My symptoms are getting better but I'm still achy, hot and cold but no fever, a little congested with runny nose, and at times blurred vision. I tried to go to the doc this morning but the clinic did not open to walk-ins til 10. 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 I need a doctors note for today's absence. I can come in if need be.\"    Background:   No chronic medical concerns-does not take any prescription medications at this time.    No results found for: \"ALT\"   No results found for: \"AST\"   No results found for: \"ALKPHOS\"   No results found for: \"CR\"   No results found for: \"GFRESTIMATED\"  No results found for: \"GFRESTBLACK\"     Patient tested positive for COVID-19 on 11/18/23, Saturday    Assessment:   Patient reports COVID-19 symptoms starting on Wednesday, 11/15/23, and tested positive for COVID-19 on Saturday, 11/18/23    Declines Paxlovid treatment at this time as she stated she feels she is \"on the mend\"    Has had body and muscle aches, headache, fatigue, all-over body weakness, congestion, runny nose, chills, and slight cough    Also reports dizziness and lightheadedness on Saturday, 11/18/23, but no loss of consciousness or fainting-has has some concentration issues, but no issues today    Had a \"strained muscle feeling\" in her legs around 1 and 1/2 days ago    Blurry vision a couple of times yesterday and the day before, as well as felt \"off\" and had a foggy feeling when she tried to drive this morning, so she was uncomfortable to drive any further-no blurry vision today    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 Patient verbalized understanding and agrees with the plan    Patient would like a note from the provider, giving her approval to miss work from Monday, 11/20/23, through today, Wednesday, 11/22/23    Writer also routed the above information to the provider regarding the patient's recent blurry vision and \"foggy\" feeling for the PCP to review and advise next steps.    Routed to provider    Does the patient meet one of the following criteria for ADS visit consideration? 16+ years old, with an [facility] PCP     TIP  Providers, please consider if this condition is appropriate for management at one of our Acute and Diagnostic Services sites.     If patient is a good candidate, please use dotphrase <dot>triageresponse and select Refer to ADS to document.     Reason for Disposition   COVID-19 diagnosed by positive lab test (e.g., PCR, rapid self-test kit) and mild symptoms (e.g., cough, fever, others) and no complications or SOB    Additional Information   Negative: SEVERE difficulty breathing (e.g., struggling for each breath, speaks in single words)   Negative: Difficult to awaken or acting confused (e.g., disoriented, slurred speech)   Negative: Bluish (or gray) lips or face now   Negative: Shock suspected (e.g., cold/pale/clammy skin, too weak to stand, low BP, rapid pulse)   Negative: Sounds like a life-threatening emergency to the triager   Negative: Diagnosed or suspected COVID-19 and symptoms lasting 3 or more weeks   Negative: COVID-19 exposure and no symptoms   Negative: COVID-19 vaccine reaction suspected (e.g., fever, headache, muscle aches) occurring 1 to 3 days after getting vaccine   Negative: " COVID-19 vaccine, questions about   Negative: Lives with someone known to have influenza (flu test positive) and flu-like symptoms (e.g., cough, runny nose, sore throat, SOB; with or without fever)   Negative: Possible COVID-19 symptoms and triager concerned about severity of symptoms or other causes   Negative: COVID-19 and breastfeeding, questions about   Negative: SEVERE or constant chest pain or pressure  (Exception: Mild central chest pain, present only when coughing.)   Negative: MODERATE difficulty breathing (e.g., speaks in phrases, SOB even at rest, pulse 100-120)   Negative: Headache and stiff neck (can't touch chin to chest)   Negative: Oxygen level (e.g., pulse oximetry) 90% or lower   Negative: Chest pain or pressure  (Exception: MILD central chest pain, present only when coughing.)   Negative: Drinking very little and dehydration suspected (e.g., no urine > 12 hours, very dry mouth, very lightheaded)   Negative: Patient sounds very sick or weak to the triager   Negative: MILD difficulty breathing (e.g., minimal/no SOB at rest, SOB with walking, pulse <100)   Negative: Fever > 103 F (39.4 C)   Negative: Fever > 101 F (38.3 C) and over 60 years of age   Negative: Fever > 100.0 F (37.8 C) and bedridden (e.g., CVA, chronic illness, recovering from surgery)   Negative: HIGH RISK patient (e.g., weak immune system, age > 64 years, obesity with BMI of 30 or higher, pregnant, chronic lung disease or other chronic medical condition) and COVID symptoms (e.g., cough, fever)  (Exceptions: Already seen by doctor or NP/PA and no new or worsening symptoms.)   Negative: HIGH RISK patient and influenza is widespread in the community and ONE OR MORE respiratory symptoms: cough, sore throat, runny or stuffy nose   Negative: HIGH RISK patient and influenza exposure within the last 7 days and ONE OR MORE respiratory symptoms: cough, sore throat, runny or stuffy nose   Negative: Oxygen level (e.g., pulse oximetry) 91 to  "94%   Negative: COVID-19 infection suspected by caller or triager and mild symptoms (cough, fever, or others) and negative COVID-19 rapid test   Negative: Fever present > 3 days (72 hours)   Negative: Fever returns after gone for over 24 hours and symptoms worse or not improved   Negative: Continuous (nonstop) coughing interferes with work or school and no improvement using cough treatment per Care Advice   Negative: Cough present > 3 weeks   Negative: COVID-19 diagnosed by positive lab test (e.g., PCR, rapid self-test kit) and NO symptoms (e.g., cough, fever, others)    Answer Assessment - Initial Assessment Questions  1. COVID-19 DIAGNOSIS: \"How do you know that you have COVID?\" (e.g., positive lab test or self-test, diagnosed by doctor or NP/PA, symptoms after exposure).        Tested positive for COVID-19 at home on 11/18/23    Symptoms started Wednesday, 11/15/23    2. COVID-19 EXPOSURE: \"Was there any known exposure to COVID before the symptoms began?\" CDC Definition of close contact: within 6 feet (2 meters) for a total of 15 minutes or more over a 24-hour period.         unknown    3. ONSET: \"When did the COVID-19 symptoms start?\"         Symptoms started Wednesday, 11/15/23    4. WORST SYMPTOM: \"What is your worst symptom?\" (e.g., cough, fever, shortness of breath, muscle aches)        Congestion with runny nose, body aches    5. COUGH: \"Do you have a cough?\" If Yes, ask: \"How bad is the cough?\"          Slight cough    6. FEVER: \"Do you have a fever?\" If Yes, ask: \"What is your temperature, how was it measured, and when did it start?\"        No fever    7. RESPIRATORY STATUS: \"Describe your breathing?\" (e.g., normal; shortness of breath, wheezing, unable to speak)         None noted    8. BETTER-SAME-WORSE: \"Are you getting better, staying the same or getting worse compared to yesterday?\"  If getting worse, ask, \"In what way?\"          Better than yesterday      9. OTHER SYMPTOMS: \"Do you have any other " "symptoms?\"  (e.g., chills, fatigue, headache, loss of smell or taste, muscle pain, sore throat)        Chills, fatigue, headache, muscle pain      10. HIGH RISK DISEASE: \"Do you have any chronic medical problems?\" (e.g., asthma, heart or lung disease, weak immune system, obesity, etc.)          None noted      11. VACCINE: \"Have you had the COVID-19 vaccine?\" If Yes, ask: \"Which one, how many shots, when did you get it?\"            None noted      12. PREGNANCY: \"Is there any chance you are pregnant?\" \"When was your last menstrual period?\"          No chance of pregnancy    13. O2 SATURATION MONITOR:  \"Do you use an oxygen saturation monitor (pulse oximeter) at home?\" If Yes, ask \"What is your reading (oxygen level) today?\" \"What is your usual oxygen saturation reading?\" (e.g., 95%)          None noted    Protocols used: Coronavirus (COVID-19) Diagnosed or [id number]    Colette Franklin RN, BSN  [facility]    "

## 2023-11-22 NOTE — TELEPHONE ENCOUNTER
Writer huddled with the PCP regarding nurse triage from 11/22/23.    Dr. Ludwig stated that the blurry vision/fogginess that the patient was feeling over the last couple of days can be related to COVID-19 infection. Dr. Ludwig recommends that the patient keep well hydrated while she is healing from COVID-19.    Writer called and left a message for the patient to return call.    Provider Recommendation Follow Up:   Unable to reach patient/caregiver. Left message to return call to [phone]. Upon return call please notify caller of provider's recommendations.         Dr. Ludwig also signed a [id number] note for the patient, which is available through the patient;'s [employer].    Writer also sent a [employer] message to the patient, relaying the above information.    Colette Franklin RN, BSN  [facility]

## 2024-05-01 ENCOUNTER — OFFICE VISIT (OUTPATIENT)
Dept: FAMILY MEDICINE | Facility: CLINIC | Age: 49
End: 2024-05-01
Payer: COMMERCIAL

## 2024-05-01 VITALS
SYSTOLIC BLOOD PRESSURE: 110 MMHG | DIASTOLIC BLOOD PRESSURE: 70 MMHG | RESPIRATION RATE: 13 BRPM | WEIGHT: 161 LBS | BODY MASS INDEX: 25.27 KG/M2 | TEMPERATURE: 97.4 F | OXYGEN SATURATION: 99 % | HEART RATE: 85 BPM | HEIGHT: 67 IN

## 2024-05-01 DIAGNOSIS — Z00.00 ROUTINE PHYSICAL EXAMINATION: Primary | ICD-10-CM

## 2024-05-01 DIAGNOSIS — E78.00 HYPERCHOLESTEROLEMIA: ICD-10-CM

## 2024-05-01 DIAGNOSIS — D56.8 OTHER THALASSEMIA (H): ICD-10-CM

## 2024-05-01 DIAGNOSIS — M22.2X1 PATELLOFEMORAL PAIN SYNDROME OF BOTH KNEES: ICD-10-CM

## 2024-05-01 DIAGNOSIS — Z86.69 HISTORY OF ATYPICAL MIGRAINE: ICD-10-CM

## 2024-05-01 DIAGNOSIS — R87.810 CERVICAL HIGH RISK HPV (HUMAN PAPILLOMAVIRUS) TEST POSITIVE: ICD-10-CM

## 2024-05-01 DIAGNOSIS — M22.2X2 PATELLOFEMORAL PAIN SYNDROME OF BOTH KNEES: ICD-10-CM

## 2024-05-01 DIAGNOSIS — D12.6 TUBULAR ADENOMA OF COLON: ICD-10-CM

## 2024-05-01 DIAGNOSIS — Z72.0 TOBACCO ABUSE DISORDER: ICD-10-CM

## 2024-05-01 LAB
ERYTHROCYTE [DISTWIDTH] IN BLOOD BY AUTOMATED COUNT: 16.1 % (ref 10–15)
HCT VFR BLD AUTO: 33 % (ref 35–47)
HGB BLD-MCNC: 10.4 G/DL (ref 11.7–15.7)
MCH RBC QN AUTO: 20.9 PG (ref 26.5–33)
MCHC RBC AUTO-ENTMCNC: 31.5 G/DL (ref 31.5–36.5)
MCV RBC AUTO: 66 FL (ref 78–100)
PLATELET # BLD AUTO: 334 10E3/UL (ref 150–450)
RBC # BLD AUTO: 4.97 10E6/UL (ref 3.8–5.2)
WBC # BLD AUTO: 4.6 10E3/UL (ref 4–11)

## 2024-05-01 PROCEDURE — 36415 COLL VENOUS BLD VENIPUNCTURE: CPT | Performed by: FAMILY MEDICINE

## 2024-05-01 PROCEDURE — 80053 COMPREHEN METABOLIC PANEL: CPT | Performed by: FAMILY MEDICINE

## 2024-05-01 PROCEDURE — 85027 COMPLETE CBC AUTOMATED: CPT | Performed by: FAMILY MEDICINE

## 2024-05-01 PROCEDURE — 99396 PREV VISIT EST AGE 40-64: CPT | Performed by: FAMILY MEDICINE

## 2024-05-01 PROCEDURE — 80061 LIPID PANEL: CPT | Performed by: FAMILY MEDICINE

## 2024-05-01 RX ORDER — CYCLOBENZAPRINE HCL 10 MG
10 TABLET ORAL 3 TIMES DAILY PRN
Qty: 30 TABLET | Refills: 1 | Status: SHIPPED | OUTPATIENT
Start: 2024-05-01

## 2024-05-01 SDOH — HEALTH STABILITY: PHYSICAL HEALTH: ON AVERAGE, HOW MANY DAYS PER WEEK DO YOU ENGAGE IN MODERATE TO STRENUOUS EXERCISE (LIKE A BRISK WALK)?: 5 DAYS

## 2024-05-01 ASSESSMENT — PAIN SCALES - GENERAL: PAINLEVEL: NO PAIN (0)

## 2024-05-01 ASSESSMENT — SOCIAL DETERMINANTS OF HEALTH (SDOH): HOW OFTEN DO YOU GET TOGETHER WITH FRIENDS OR RELATIVES?: ONCE A WEEK

## 2024-05-01 NOTE — PROGRESS NOTES
Assessment/Plan:     Routine physical examination  Routine healthcare maintenance.  Preventative cares reviewed.  Annual physical exams to continue.  Hepatitis B screen previously nonreactive.  Patient declines hepatitis B immunization series or further immunizations at this time.  Prior Pap smear and cotesting normal on May 5, 2023 with recommendation for 3-year follow-up with cotesting.    History of atypical migraine  History of atypical migraine.  Patient knows how to manage on her own with ibuprofen 800 mg at onset, laying down in a darkened room, and occasional cyclobenzaprine.  This prescription was  with last refill apparently 2018 so we will provide updated prescription today.  - cyclobenzaprine (FLEXERIL) 10 MG tablet  Dispense: 30 tablet; Refill: 1    Tobacco abuse disorder  Tobacco abuse disorder.  Using vape pen only and continue attempts at complete cessation.    Other thalassemia (H24)  History of thalassemia and will update CBC to ensure no evidence for significant microcytic anemia.  - Comprehensive metabolic panel  - CBC with platelets    Hypercholesterolemia  Hypercholesterolemia.  Lipid cascade updated today.  Dietary and exercise modification to maintain weight less than 160 pounds ideally.  - Lipid panel reflex to direct LDL Fasting     Patellofemoral pain syndrome of both knees  Did discuss vastus medialis oblique muscle strengthening exercises and avoidance of exacerbating triggers.  Further assessment with bilateral knee x-ray, physical therapy or Ortho referral etc. if persistent concerns.     Cervical high risk HPV test positive  History of positive high risk HPV historically.  Most recent Pap smear and HPV cotesting May 5, 2023 negative with recommendation for 3-year follow-up.    Tubular adenoma of colon  Prior colonoscopy 2023 with 5-year follow-up recommendation.    Annual Physical Exam.  Encouraged healthy lifestyle habits including regular exercise, healthy eating  "habits, and adequate calcium and vitamin D intake.      The following high BMI interventions were performed this visit: encouragement to exercise, weight monitoring, weight loss from baseline weight, and lifestyle education regarding diet.  Ensure ongoing efforts to achieve weight goal < 160 pounds ideally.          Subjective:     Nel Landers is a 48 year old female who presents for an annual exam.  In general doing well.  History of atypical migraine that \"presents as mini strokes\" have been well-controlled.  Less frequent.  Knows how to manage it through use of ibuprofen, laying down in a darkened room as well as occasional cyclobenzaprine tablet.  This prescription is outdated since 2018.  Tobacco abuse now through vaping only and no longer cigarette use.  History of thalassemia described.  Hypercholesterolemia noted.  Trying to stay active.  Does have bilateral knee pain worse with stair climbing or after sitting.  Comprehensive review of systems as above otherwise all negative.      Pap smear and cotesting on 5/5/23:  NIL Pap, Neg HPV result. Next Pap and HPV due in 3 years.  MyChart comment released to pt.     2/17/16 NIL Pap, + HR HPV (neg 16/18).  3/18/19 NIL Pap, Neg HPV  5/5/23 NIL Pap, Neg HPV. Plan cotest in 3 years.       - Johan   1 daughter - Katalina (29)   1 son - Mazin (26)   1 son - Larry (21)   1 daughter - Carole (16)   Tobacco:  vape (nicotine \"low percentage\")   EtOH:  none ( since age 23 and I like my job)   Mom - HTN, M.S. 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  Lipid Profile: Yes  Glucose Screen: Yes  Last Mammogram: Yes  Colonoscopy: Yes  Last Dexa: N/A    Immunization History   Administered Date(s) Administered    DT (PEDS <7y) 2000    Pneumococcal 20 valent Conjugate (Prevnar 20) 2023    TDAP (Adacel,Boostrix) 2011, 2023     Immunization status: up to date and documented.      Gynecologic History  Patient's last menstrual period was 2024 (exact date).  Sexually active: No  Contraception: abstinence  Last Pap: 23. Results were: normal      OB History    Para Term  AB Living   2 2 0 0 0 2   SAB IAB Ectopic Multiple Live Births   0 0 0 0 0      # Outcome Date GA Lbr Phillip/2nd Weight Sex Type Anes PTL Lv   2 Para            1 Para                Current Outpatient Medications   Medication Sig Dispense Refill    cyclobenzaprine (FLEXERIL) 10 MG tablet Take 1 tablet (10 mg) by mouth 3 times daily as needed for other (migraine) 30 tablet 1     Past Medical History:   Diagnosis Date    Atypical migraine     Managed with flexiril, ibuprofen, and rest    Beta thalassemia (H)     Anemic,      Past Surgical History:   Procedure Laterality Date    NO HISTORY OF SURGERY      SKIN GRAFT, EACH ADDN 100SQCM  During childhood    Tip of left 5th finger was removed and needed skin graft to replace missing skin     Vicodin [hydrocodone-acetaminophen] and Latex  Family History   Problem Relation Age of Onset    Hypertension Mother     Multiple Sclerosis Mother     Pacemaker Father     Hypertension Sister     Lung Cancer Maternal Grandmother     Diabetes Paternal Grandmother     Hyperlipidemia No family hx of     Diabetes Father      Social History     Socioeconomic History    Marital status:      Spouse name: Not on file    Number of children: Not on file    Years of education: Not on file    Highest education level: Not on file    Occupational History    Not on file   Tobacco Use    Smoking status: Former     Current packs/day: 0.75     Average packs/day: 0.8 packs/day for 25.0 years (18.8 ttl pk-yrs)     Types: Cigarettes     Passive exposure: Never    Smokeless tobacco: Never    Tobacco comments:     Patient wants to start quitting within a month with a friend    Vaping Use    Vaping status: Every Day   Substance and Sexual Activity    Alcohol use: Not on file    Drug use: Not on file    Sexual activity: Not on file   Other Topics Concern    Not on file   Social History Narrative    Patient works for a [employer] and 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     Social Determinants of Health     Financial Resource Strain: Low Risk  (5/1/2024)    Financial Resource Strain     Within the past 12 months, have you or your family members you live with been unable to get utilities (heat, electricity) when it was really needed?: No   Food Insecurity: Low Risk  (5/1/2024)    Food Insecurity     Within the past 12 months, did you worry that your food would run out before you got money to buy more?: No     Within the past 12 months, did the food you bought just not last and you didn t have money to get more?: No   Transportation Needs: Low Risk  (5/1/2024)    Transportation Needs     Within the past 12 months, has lack of transportation kept you from medical appointments, getting your medicines, non-medical meetings or appointments, work, or from getting things that you need?: No   Physical Activity: Unknown (5/1/2024)    Exercise Vital Sign     Days of Exercise per Week: 5 days     Minutes of Exercise per Session: Not on file   Stress: Stress Concern Present (5/1/2024)    [employer] - Occupational Stress Questionnaire     Feeling of Stress : Very much   Social Connections: Unknown (5/1/2024)    Social Connection and Isolation Panel [NHANES]     Frequency of Communication with Friends  "and Family: Not on file     Frequency of Social Gatherings with Friends and Family: Once a week     Attends Church Services: Not on file     Active Member of Clubs or Organizations: Not on file     Attends Club or Organization Meetings: Not on file     Marital Status: Not on file   Interpersonal Safety: Low Risk  (5/1/2024)    Interpersonal Safety     Do you feel physically and emotionally safe where you currently live?: Yes     Within the past 12 months, have you been hit, slapped, kicked or otherwise physically hurt by someone?: No     Within the past 12 months, have you been humiliated or emotionally abused in other ways by your partner or ex-partner?: No   Housing Stability: Low Risk  (5/1/2024)    Housing Stability     Do you have housing? : Yes     Are you worried about losing your housing?: No       Review of Systems  General:  Denies problem  Eyes: Denies problem  Ears/Nose/Throat: Denies problem  Cardiovascular: Denies problem  Respiratory:  Denies problem  Gastrointestinal:  Denies problem, Genitourinary: Denies problem  Musculoskeletal:  Denies problem  Skin: Denies problem  Neurologic: Denies problem  Psychiatric: Denies problem  Endocrine: Denies problem  Heme/Lymphatic: Denies problem   Allergic/Immunologic: Denies problem       [unfilled]    Objective:        Vitals:    05/01/24 0705   BP: 110/70   Pulse: 85   Resp: 13   Temp: 97.4  F (36.3  C)   SpO2: 99%   Weight: 73 kg (161 lb)   Height: 1.708 m (5' 7.25\")   PainSc: No Pain (0)     Body mass index is 25.03 kg/m .    Physical Exam:    General Appearance: Alert, pleasant, appears stated age  Head: Normocephalic, without obvious abnormality  Eyes: PERRL, conjunctiva/corneas clear, EOM's intact  Ears: Normal TM's and external ear canals, both ears  Nose: Nares normal, septum midline,mucosa normal, no drainage  Throat: Lips, mucosa, and tongue normal; teeth and gums normal; oropharynx is clear  Neck: Supple,without lymphadenopathy or " thyromegally  Lungs: Clear to auscultation bilaterally, respirations unlabored  Breasts: deferred per patient  Heart: Regular rate and rhythm, no murmur   Abdomen: Soft, non-tender, no masses, no organomegaly  Pelvic:  deferred  Extremities: Extremities with strong and symmetric pulses, no cyanosis or edema  Skin: Skin color, texture normal, no rashes or lesions  Neurologic: Normal            This note has been dictated using voice recognition software and as a result may contain minor grammatical errors and unintended word substitutions.

## 2024-05-02 ENCOUNTER — PATIENT OUTREACH (OUTPATIENT)
Dept: GASTROENTEROLOGY | Facility: CLINIC | Age: 49
End: 2024-05-02
Payer: COMMERCIAL

## 2024-05-02 LAB
ALBUMIN SERPL BCG-MCNC: 4.2 G/DL (ref 3.5–5.2)
ALP SERPL-CCNC: 57 U/L (ref 40–150)
ALT SERPL W P-5'-P-CCNC: 27 U/L (ref 0–50)
ANION GAP SERPL CALCULATED.3IONS-SCNC: 8 MMOL/L (ref 7–15)
AST SERPL W P-5'-P-CCNC: 20 U/L (ref 0–45)
BILIRUB SERPL-MCNC: 0.5 MG/DL
BUN SERPL-MCNC: 7.3 MG/DL (ref 6–20)
CALCIUM SERPL-MCNC: 8.7 MG/DL (ref 8.6–10)
CHLORIDE SERPL-SCNC: 107 MMOL/L (ref 98–107)
CHOLEST SERPL-MCNC: 174 MG/DL
CREAT SERPL-MCNC: 0.69 MG/DL (ref 0.51–0.95)
DEPRECATED HCO3 PLAS-SCNC: 25 MMOL/L (ref 22–29)
EGFRCR SERPLBLD CKD-EPI 2021: >90 ML/MIN/1.73M2
FASTING STATUS PATIENT QL REPORTED: NORMAL
GLUCOSE SERPL-MCNC: 89 MG/DL (ref 70–99)
HDLC SERPL-MCNC: 60 MG/DL
LDLC SERPL CALC-MCNC: 100 MG/DL
NONHDLC SERPL-MCNC: 114 MG/DL
POTASSIUM SERPL-SCNC: 3.7 MMOL/L (ref 3.4–5.3)
PROT SERPL-MCNC: 6.9 G/DL (ref 6.4–8.3)
SODIUM SERPL-SCNC: 140 MMOL/L (ref 135–145)
TRIGL SERPL-MCNC: 69 MG/DL

## 2025-07-21 ENCOUNTER — PATIENT OUTREACH (OUTPATIENT)
Dept: CARE COORDINATION | Facility: CLINIC | Age: 50
End: 2025-07-21
Payer: COMMERCIAL

## 2025-08-20 ENCOUNTER — OFFICE VISIT (OUTPATIENT)
Dept: FAMILY MEDICINE | Facility: CLINIC | Age: 50
End: 2025-08-20
Payer: COMMERCIAL

## 2025-08-20 VITALS
SYSTOLIC BLOOD PRESSURE: 120 MMHG | OXYGEN SATURATION: 99 % | HEIGHT: 67 IN | WEIGHT: 175 LBS | HEART RATE: 82 BPM | RESPIRATION RATE: 15 BRPM | TEMPERATURE: 97.8 F | DIASTOLIC BLOOD PRESSURE: 78 MMHG | BODY MASS INDEX: 27.47 KG/M2

## 2025-08-20 DIAGNOSIS — Z72.0 TOBACCO ABUSE DISORDER: ICD-10-CM

## 2025-08-20 DIAGNOSIS — E66.3 OVERWEIGHT: ICD-10-CM

## 2025-08-20 DIAGNOSIS — R87.810 CERVICAL HIGH RISK HPV (HUMAN PAPILLOMAVIRUS) TEST POSITIVE: ICD-10-CM

## 2025-08-20 DIAGNOSIS — Z12.31 VISIT FOR SCREENING MAMMOGRAM: ICD-10-CM

## 2025-08-20 DIAGNOSIS — D12.6 TUBULAR ADENOMA OF COLON: ICD-10-CM

## 2025-08-20 DIAGNOSIS — E78.00 HYPERCHOLESTEROLEMIA: ICD-10-CM

## 2025-08-20 DIAGNOSIS — N95.1 MENOPAUSAL SYNDROME (HOT FLASHES): ICD-10-CM

## 2025-08-20 DIAGNOSIS — D56.8 OTHER THALASSEMIA: ICD-10-CM

## 2025-08-20 DIAGNOSIS — Z00.00 ROUTINE PHYSICAL EXAMINATION: Primary | ICD-10-CM

## 2025-08-20 PROCEDURE — 99396 PREV VISIT EST AGE 40-64: CPT | Performed by: FAMILY MEDICINE

## 2025-08-20 PROCEDURE — 3078F DIAST BP <80 MM HG: CPT | Performed by: FAMILY MEDICINE

## 2025-08-20 PROCEDURE — 3074F SYST BP LT 130 MM HG: CPT | Performed by: FAMILY MEDICINE

## 2025-08-20 PROCEDURE — 1126F AMNT PAIN NOTED NONE PRSNT: CPT | Performed by: FAMILY MEDICINE

## 2025-08-20 SDOH — HEALTH STABILITY: PHYSICAL HEALTH: ON AVERAGE, HOW MANY DAYS PER WEEK DO YOU ENGAGE IN MODERATE TO STRENUOUS EXERCISE (LIKE A BRISK WALK)?: 3 DAYS

## 2025-08-20 ASSESSMENT — SOCIAL DETERMINANTS OF HEALTH (SDOH): HOW OFTEN DO YOU GET TOGETHER WITH FRIENDS OR RELATIVES?: ONCE A WEEK

## 2025-08-20 ASSESSMENT — PAIN SCALES - GENERAL: PAINLEVEL_OUTOF10: NO PAIN (0)

## 2025-08-21 ENCOUNTER — PATIENT OUTREACH (OUTPATIENT)
Dept: CARE COORDINATION | Facility: CLINIC | Age: 50
End: 2025-08-21
Payer: COMMERCIAL